# Patient Record
Sex: FEMALE | Race: WHITE | NOT HISPANIC OR LATINO | Employment: FULL TIME | URBAN - NONMETROPOLITAN AREA
[De-identification: names, ages, dates, MRNs, and addresses within clinical notes are randomized per-mention and may not be internally consistent; named-entity substitution may affect disease eponyms.]

---

## 2017-08-10 ENCOUNTER — OFFICE VISIT (OUTPATIENT)
Dept: CARDIOLOGY | Facility: CLINIC | Age: 57
End: 2017-08-10
Payer: COMMERCIAL

## 2017-08-10 VITALS
HEIGHT: 66 IN | SYSTOLIC BLOOD PRESSURE: 120 MMHG | BODY MASS INDEX: 37.77 KG/M2 | HEART RATE: 82 BPM | OXYGEN SATURATION: 97 % | DIASTOLIC BLOOD PRESSURE: 80 MMHG | WEIGHT: 235 LBS

## 2017-08-10 DIAGNOSIS — R94.31 NONSPECIFIC ABNORMAL ELECTROCARDIOGRAM (ECG) (EKG): ICD-10-CM

## 2017-08-10 DIAGNOSIS — Z01.810 PREOP CARDIOVASCULAR EXAM: ICD-10-CM

## 2017-08-10 PROCEDURE — 99204 OFFICE O/P NEW MOD 45 MIN: CPT | Performed by: INTERNAL MEDICINE

## 2017-08-10 ASSESSMENT — ENCOUNTER SYMPTOMS
HEARTBURN: 0
NERVOUS/ANXIOUS: 0
BRUISES/BLEEDS EASILY: 0
HEADACHES: 0
CHILLS: 0
DIZZINESS: 0
BLURRED VISION: 0
COUGH: 0
NAUSEA: 0
DEPRESSION: 0
PND: 0
FEVER: 0
SHORTNESS OF BREATH: 0
EYE DISCHARGE: 0
MYALGIAS: 0
PALPITATIONS: 0

## 2017-08-10 NOTE — Clinical Note
"     [facility] [address] [place] [address]  Phone: [phone]  Fax: [phone]              Vashti Vinson  1960    Encounter Date: 8/10/2017    Jason Edge M.D.          PROGRESS NOTE:  Subjective:   Vashti Vinson is a 56 y.o. female who presents today Self-referred because of abnormal EKG.  She is scheduled for  Right knee surgery on August 14.  Preop EKG demonstrates slow R wave progression across precordium and computer has raised question of anterior infarct.  EKG from 2009 is similar slow R-wave progression and T-wave inversion in V2 and V3  The patient has no history of heart disease or symptoms thereof   2 episodes of 2 minutesI have epigastric pain, worse with inspiration. One episode associated  With brief nausea.  Weight gain in last few years.  Mild fatigue  Some intermittent snoring.  Serum lipids unknown  Tobacco use 3 or 4 cigarettes per week    Past Medical History   Diagnosis Date   • Seizure (CMS-HCC)      \"Many years ago after getting out of hot tub. Passed out\" 1998   • Dental disorder      Root canal 7/25 left lower molar   • Arthritis      Past Surgical History   Procedure Laterality Date   • Knee arthroscopy Right    • Tonsillectomy     • Acl reconstruction Left    • Carpal tunnel release Right    • Inguinal hernia repair bilateral Bilateral      Family History   Problem Relation Age of Onset   • Other Neg Hx    • No Known Problems Mother    • No Known Problems Father    • No Known Problems Sister    • No Known Problems Brother      History   Smoking status   • Former Smoker   • Quit date: 03/07/2017   Smokeless tobacco   • Never Used     Allergies   Allergen Reactions   • Penicillins Rash and Swelling     Reaction as child     Outpatient Encounter Prescriptions as of 8/10/2017   Medication Sig Dispense Refill   • ibuprofen (MOTRIN) 200 MG Tab Take 200 mg by mouth every 6 hours as needed.     • " "hydrocodone-acetaminophen (NORCO) 5-325 MG Tab per tablet Take 1-2 Tabs by mouth every 8 hours as needed. 60 Tab 0     No facility-administered encounter medications on file as of 8/10/2017.     Review of Systems   Constitutional: Positive for malaise/fatigue. Negative for fever and chills.   Eyes: Negative for blurred vision and discharge.   Respiratory: Negative for cough and shortness of breath.    Cardiovascular: Negative for chest pain, palpitations, leg swelling and PND.   Gastrointestinal: Negative for heartburn and nausea.   Genitourinary: Negative for dysuria and urgency.   Musculoskeletal: Positive for joint pain. Negative for myalgias.   Skin: Negative for itching and rash.   Neurological: Negative for dizziness and headaches.   Endo/Heme/Allergies: Negative for environmental allergies. Does not bruise/bleed easily.   Psychiatric/Behavioral: Negative for depression. The patient is not nervous/anxious.         Objective:   /80 mmHg  Pulse 82  Ht 1.676 m (5' 5.98\")  SpO2 97%    Physical Exam   Constitutional: She is oriented to person, place, and time. She appears well-developed and well-nourished.   HENT:   Head: Normocephalic and atraumatic.   Eyes: Conjunctivae and EOM are normal. No scleral icterus.   Neck: Neck supple. No JVD present. No thyromegaly present.   Cardiovascular: Normal rate, regular rhythm and normal heart sounds.  Exam reveals no gallop and no friction rub.    No murmur heard.  Pulmonary/Chest: Effort normal and breath sounds normal. No respiratory distress. She has no wheezes. She has no rales. She exhibits no tenderness.   Abdominal: Soft. Bowel sounds are normal. She exhibits no distension and no mass. There is no tenderness.   Neurological: She is alert and oriented to person, place, and time. Coordination normal.   Skin: Skin is warm and dry. No rash noted. No pallor.   Psychiatric: She has a normal mood and affect. Her behavior is normal. Judgment and thought content " normal.       Assessment:     1. Preop cardiovascular exam     2. Nonspecific abnormal electrocardiogram (ECG) (EKG)         Medical Decision Making:  Today's Assessment / Status / Plan:   The likelihood that this patient has had an Infarct  Is very low  EKG is very similar to EKG of 2009  Patient is considered low  Risk for surgery and anesthesia  Echocardiogram ordered and this will be done  Electively but results of the echo do not affect the above recommendation.    Electively, consider overnight pulse oximetry to evaluate for sleep apnea  Thank you for this consult        Librado Santana M.D.  [address]  [place] [address]  VIA In Basket

## 2017-08-10 NOTE — PROGRESS NOTES
"Subjective:   Vashti Vinson is a 56 y.o. female who presents today Self-referred because of abnormal EKG.  She is scheduled for  Right knee surgery on August 14.  Preop EKG demonstrates slow R wave progression across precordium and computer has raised question of anterior infarct.  EKG from 2009 is similar slow R-wave progression and T-wave inversion in V2 and V3  The patient has no history of heart disease or symptoms thereof   2 episodes of 2 minutesI have epigastric pain, worse with inspiration. One episode associated  With brief nausea.  Weight gain in last few years.  Mild fatigue  Some intermittent snoring.  Serum lipids unknown  Tobacco use 3 or 4 cigarettes per week    Past Medical History   Diagnosis Date   • Seizure (CMS-HCC)      \"Many years ago after getting out of hot tub. Passed out\" 1998   • Dental disorder      Root canal 7/25 left lower molar   • Arthritis      Past Surgical History   Procedure Laterality Date   • Knee arthroscopy Right    • Tonsillectomy     • Acl reconstruction Left    • Carpal tunnel release Right    • Inguinal hernia repair bilateral Bilateral      Family History   Problem Relation Age of Onset   • Other Neg Hx    • No Known Problems Mother    • No Known Problems Father    • No Known Problems Sister    • No Known Problems Brother      History   Smoking status   • Former Smoker   • Quit date: 03/07/2017   Smokeless tobacco   • Never Used     Allergies   Allergen Reactions   • Penicillins Rash and Swelling     Reaction as child     Outpatient Encounter Prescriptions as of 8/10/2017   Medication Sig Dispense Refill   • ibuprofen (MOTRIN) 200 MG Tab Take 200 mg by mouth every 6 hours as needed.     • hydrocodone-acetaminophen (NORCO) 5-325 MG Tab per tablet Take 1-2 Tabs by mouth every 8 hours as needed. 60 Tab 0     No facility-administered encounter medications on file as of 8/10/2017.     Review of Systems   Constitutional: Positive for malaise/fatigue. Negative for fever and " "chills.   Eyes: Negative for blurred vision and discharge.   Respiratory: Negative for cough and shortness of breath.    Cardiovascular: Negative for chest pain, palpitations, leg swelling and PND.   Gastrointestinal: Negative for heartburn and nausea.   Genitourinary: Negative for dysuria and urgency.   Musculoskeletal: Positive for joint pain. Negative for myalgias.   Skin: Negative for itching and rash.   Neurological: Negative for dizziness and headaches.   Endo/Heme/Allergies: Negative for environmental allergies. Does not bruise/bleed easily.   Psychiatric/Behavioral: Negative for depression. The patient is not nervous/anxious.         Objective:   /80 mmHg  Pulse 82  Ht 1.676 m (5' 5.98\")  SpO2 97%    Physical Exam   Constitutional: She is oriented to person, place, and time. She appears well-developed and well-nourished.   HENT:   Head: Normocephalic and atraumatic.   Eyes: Conjunctivae and EOM are normal. No scleral icterus.   Neck: Neck supple. No JVD present. No thyromegaly present.   Cardiovascular: Normal rate, regular rhythm and normal heart sounds.  Exam reveals no gallop and no friction rub.    No murmur heard.  Pulmonary/Chest: Effort normal and breath sounds normal. No respiratory distress. She has no wheezes. She has no rales. She exhibits no tenderness.   Abdominal: Soft. Bowel sounds are normal. She exhibits no distension and no mass. There is no tenderness.   Neurological: She is alert and oriented to person, place, and time. Coordination normal.   Skin: Skin is warm and dry. No rash noted. No pallor.   Psychiatric: She has a normal mood and affect. Her behavior is normal. Judgment and thought content normal.       Assessment:     1. Preop cardiovascular exam     2. Nonspecific abnormal electrocardiogram (ECG) (EKG)         Medical Decision Making:  Today's Assessment / Status / Plan:   The likelihood that this patient has had an Infarct  Is very low  EKG is very similar to EKG of " 2009  Patient is considered low  Risk for surgery and anesthesia  Echocardiogram ordered and this will be done  Electively but results of the echo do not affect the above recommendation.    Electively, consider overnight pulse oximetry to evaluate for sleep apnea  Thank you for this consult

## 2017-08-10 NOTE — MR AVS SNAPSHOT
"Vashti Vinson   8/10/2017 8:20 AM   Office Visit   MRN: [id number]    Department:  [facility]   Dept Phone:  [phone]    Description:  Female : 1960   Provider:  Jason Edge M.D.           Reason for Visit     New Patient           Allergies as of 8/10/2017     Allergen Noted Reactions    Penicillins 2014   Rash, Swelling    Reaction as child      You were diagnosed with     Preop cardiovascular exam   [559634]       Nonspecific abnormal electrocardiogram (ECG) (EKG)   [794.31.ICD-9-CM]         Vital Signs     Blood Pressure Pulse Height Weight Body Mass Index Oxygen Saturation    120/80 mmHg 82 1.676 m (5' 5.98\") 106.595 kg (235 lb) 37.95 kg/m2 97%    Smoking Status                   Former Smoker           Basic Information     Date Of Birth Sex Race Ethnicity Preferred Language    1960 Female White Non- English      Your appointments     Aug 14, 2017  9:30 AM   Pre Admission Scheduled with PRE ADMIT TEST 1 [facility]   PREADMIT TESTS [facility] (--)    [address] [place] [address]   [phone]              Problem List              ICD-10-CM Priority Class Noted - Resolved    OA (osteoarthritis) of knee M17.10   2014 - Present      Health Maintenance        Date Due Completion Dates    IMM DTaP/Tdap/Td Vaccine (1 - Tdap) 10/20/1981 ---    PAP SMEAR 10/20/1983 ---    COLONOSCOPY 10/20/2012 ---    MAMMOGRAM 2013, 3/30/2012, 3/19/2012, 2009, 10/22/2009, 10/22/2009, 10/17/2008, 10/17/2008, 2007, 2007    IMM INFLUENZA (1) 2017 ---            Current Immunizations     No immunizations on file.      Below and/or attached are the medications your provider expects you to take. Review all of your home medications and newly ordered medications with your provider and/or pharmacist. Follow medication instructions as directed by your provider and/or pharmacist. Please keep your medication list with you and share with your " provider. Update the information when medications are discontinued, doses are changed, or new medications (including over-the-counter products) are added; and carry medication information at all times in the event of emergency situations     Allergies:  PENICILLINS - Rash,Swelling               Medications  Valid as of: August 11, 2017 - 10:15 AM    Generic Name Brand Name Tablet Size Instructions for use    Hydrocodone-Acetaminophen (Tab) NORCO 5-325 MG Take 1-2 Tabs by mouth every 8 hours as needed.        Ibuprofen (Tab) MOTRIN 200 MG Take 200 mg by mouth every 6 hours as needed.        .                 Medicines prescribed today were sent to:     [id number] - [address] AT [facility] & [address] [address]    Phone: [phone] Fax: [phone]    Open 24 Hours?: No      Medication refill instructions:       If your prescription bottle indicates you have medication refills left, it is not necessary to call your provider’s office. Please contact your pharmacy and they will refill your medication.    If your prescription bottle indicates you do not have any refills left, you may request refills at any time through one of the following ways: The online [employer] system (except Urgent Care), by calling your provider’s office, or by asking your pharmacy to contact your provider’s office with a refill request. Medication refills are processed only during regular business hours and may not be available until the next business day. Your provider may request additional information or to have a follow-up visit with you prior to refilling your medication.   *Please Note: Medication refills are assigned a new Rx number when refilled electronically. Your pharmacy may indicate that no refills were authorized even though a new prescription for the same medication is available at the pharmacy. Please request the medicine by name with the pharmacy before contacting  your provider for a refill.           Heap Access Code: [email or web address]  Expires: 9/6/2017  2:14 PM    Heap  A secure, online tool to manage your health information     [employer]’s Heap® is a secure, online tool that connects you to your personalized health information from the privacy of your home -- day or night - making it very easy for you to manage your healthcare. Once the activation process is completed, you can even access your medical information using the Heap cait, which is available for free in the Apple Cait store or Google Play store.     Heap provides the following levels of access (as shown below):   My Chart Features   [facility] Primary Care Doctor [facility]  Specialists [facility]  Urgent  Care Non-[facility]  Primary Care  Doctor   Email your healthcare team securely and privately 24/7 X X X    Manage appointments: schedule your next appointment; view details of past/upcoming appointments X      Request prescription refills. X      View recent personal medical records, including lab and immunizations X X X X   View health record, including health history, allergies, medications X X X X   Read reports about your outpatient visits, procedures, consult and ER notes X X X X   See your discharge summary, which is a recap of your hospital and/or ER visit that includes your diagnosis, lab results, and care plan. X X       How to register for Heap:  1. Go to  [email or web address].  2. Click on the Sign Up Now box, which takes you to the New Member Sign Up page. You will need to provide the following information:  a. Enter your Heap Access Code exactly as it appears at the top of this page. (You will not need to use this code after you’ve completed the sign-up process. If you do not sign up before the expiration date, you must request a new code.)   b. Enter your date of birth.   c. Enter your home email address.   d. Click Submit, and follow the next screen’s instructions.  3. Create a  MyChart ID. This will be your [employer] login ID and cannot be changed, so think of one that is secure and easy to remember.  4. Create a [employer] password. You can change your password at any time.  5. Enter your Password Reset Question and Answer. This can be used at a later time if you forget your password.   6. Enter your e-mail address. This allows you to receive e-mail notifications when new information is available in [employer].  7. Click Sign Up. You can now view your health information.    For assistance activating your [employer] account, call [phone]

## 2017-08-11 DIAGNOSIS — R94.31 NONSPECIFIC ABNORMAL ELECTROCARDIOGRAM (ECG) (EKG): ICD-10-CM

## 2017-08-14 PROBLEM — M17.10 KNEE ARTHROPATHY: Status: ACTIVE | Noted: 2017-08-14

## 2017-08-16 ENCOUNTER — TELEPHONE (OUTPATIENT)
Dept: CARDIOLOGY | Facility: MEDICAL CENTER | Age: 57
End: 2017-08-16

## 2017-08-16 NOTE — TELEPHONE ENCOUNTER
"----- Message from Kalli Okeefe sent at 8/16/2017 12:17 PM PDT -----  Regarding: RE: Authorization is Denied by Insurance  All noted with thanks and will re-submit request.      ----- Message -----     From: Starr Leach R.N.     Sent: 8/16/2017  11:58 AM       To: Starr Leach R.N., Kalli Okeefe  Subject: FW: Authorization is Denied by Insurance           Kalli, I called. The case is closed & needs to be re-submitted. Here is new info from Dr. Edge to use when you do.  Thanks, FARRAH Gill    ----- Message -----     From: Jason Edge M.D.     Sent: 8/16/2017  11:24 AM       To: Starr Leach R.N.  Subject: RE: Authorization is Denied by Insurance         Variable abnormal EKG does raise the question of previous small anteroseptal myocardial infarction.  An echocardiogram is recommended to exclude any structural heart disease that may be causing the abnormal EKG  ----- Message -----     From: Starr Leach R.N.     Sent: 8/16/2017  10:41 AM       To: Starr Leach R.N., Jason Edge M.D.  Subject: FW: Authorization is Denied by Insurance         RG please advise if you want to proceed with echo & I will try to call & see what they need. You note says echo was elective.  ----- Message -----     From: Kalli Okeefe     Sent: 8/16/2017   9:17 AM       To: Starr Leach R.N., Kalli Okeefe  Subject: Authorization is Denied by Insurance             Dr Edge ordered an echocardiogram but insurance has denied authorization as it does not meet criteria e.g. Abnormal ekg and the abnormal readings >> \"Echocardiography is appropriate in the further evaluation of abnormal test results which suggest underlying cardiac disease. The information provided does not indicate that this is the reason for the study.\"    You/Dr Edge may appeal the decision by calling [facility] [phone] following prompt to physicians review, identfying the case by patient's [place] #[id number].    Please let me know either way.  " Thank you.

## 2017-08-18 ENCOUNTER — TELEPHONE (OUTPATIENT)
Dept: CARDIOLOGY | Facility: MEDICAL CENTER | Age: 57
End: 2017-08-18

## 2017-08-18 NOTE — TELEPHONE ENCOUNTER
"----- Message from Kalli Okeefe sent at 8/18/2017  3:26 PM PDT -----  Regarding: RE: Authorization is Denied by Insurance  I submitted the request on Wednesday and it is still 'in progress' meaning 'under review'.  Would it be possible to ask to phone [employer] again to physician's review to get this auth pushed through?  [facility] [phone] following prompt to physicians review, identfying the case by patient's [place] #[id number].   Thank you Bridget    ----- Message -----     From: Starr Leach R.N.     Sent: 8/16/2017  11:58 AM       To: Starr Leach R.N., Kalli Okeefe  Subject: FW: Authorization is Denied by Insurance           Kalli, I called. The case is closed & needs to be re-submitted. Here is new info from Dr. Edge to use when you do.  Thanks, FARRAH Gill    ----- Message -----     From: Jason Edge M.D.     Sent: 8/16/2017  11:24 AM       To: Starr Leach R.N.  Subject: RE: Authorization is Denied by Insurance         Variable abnormal EKG does raise the question of previous small anteroseptal myocardial infarction.  An echocardiogram is recommended to exclude any structural heart disease that may be causing the abnormal EKG  ----- Message -----     From: Starr Leach R.N.     Sent: 8/16/2017  10:41 AM       To: Starr Leach R.N., Jason Edge M.D.  Subject: FW: Authorization is Denied by Insurance         RG please advise if you want to proceed with echo & I will try to call & see what they need. You note says echo was elective.  ----- Message -----     From: Kalli Okeefe     Sent: 8/16/2017   9:17 AM       To: Starr Leach R.N., Kalli Okeefe  Subject: Authorization is Denied by Insurance             Dr Edge ordered an echocardiogram but insurance has denied authorization as it does not meet criteria e.g. Abnormal ekg and the abnormal readings >> \"Echocardiography is appropriate in the further evaluation of abnormal test results which suggest underlying cardiac disease. The " "information provided does not indicate that this is the reason for the study.\"    You/Dr Edge may appeal the decision by calling [facility] [phone] following prompt to physicians review, identfying the case by patient's [facility] #[id number].    Please let me know either way.  Thank you.          "

## 2017-08-18 NOTE — TELEPHONE ENCOUNTER
Spoke with [employer]:  Approval # [id number].  Good for 8-16-17 through 9-14-17. To Kalli via staff message.

## 2017-08-24 PROBLEM — Z96.651 STATUS POST TOTAL RIGHT KNEE REPLACEMENT: Status: ACTIVE | Noted: 2017-08-24

## 2017-09-12 ENCOUNTER — TELEPHONE (OUTPATIENT)
Dept: CARDIOLOGY | Facility: MEDICAL CENTER | Age: 57
End: 2017-09-12

## 2017-09-12 DIAGNOSIS — R94.31 NONSPECIFIC ABNORMAL ELECTROCARDIOGRAM (ECG) (EKG): ICD-10-CM

## 2017-09-12 NOTE — TELEPHONE ENCOUNTER
Echo now in media.  ER records rec'd (will be scanned Wed.9/13) To Dr. Edge to advise on pt's questions.

## 2017-09-12 NOTE — TELEPHONE ENCOUNTER
Saw Dr. Edge 8/10/17 as NP for surgical clearance due to abnormal EKG. He cleared her for total knee surgery. Dr. FRIED did advise an elective echo - which was finally approved by her insurance & done 9/3/17 @ [facility]. She calls today to ask:  1) what are echo results & does she need to F/U with Dr. Edge?  2) Had episode CP a few days ago. So severe went to [facility] ER. On & off x 35 min. Troponins were negative per ER MD. Rest of cardiac w/u apparently negative. Could Dr. Edge review ER records      and advise if echo is all OK does she need any further w/u?     I requested records from [facility], then will forward to Dr. Edge.

## 2017-09-12 NOTE — TELEPHONE ENCOUNTER
----- Message from Madeline Sewell sent at 9/11/2017  4:58 PM PDT -----  Regarding: pain in chest - went to E/R  Contact: [phone]  FARIHA/brenden    Pt calling to report very sharp pain in center of chest out to her back this morning & went to E/R at [place] in [place], was tested and no results were alarming. Pt was told to call RG.  Please call Vashti at

## 2017-09-14 ENCOUNTER — TELEPHONE (OUTPATIENT)
Dept: CARDIOLOGY | Facility: MEDICAL CENTER | Age: 57
End: 2017-09-14

## 2017-09-14 NOTE — TELEPHONE ENCOUNTER
[place] records reviewed  Chest pain did not sound cardiac  EKG stable  No further workup needed in my opinion

## 2017-09-14 NOTE — TELEPHONE ENCOUNTER
----- Message from Reyna Shen R.N. sent at 9/14/2017 10:41 AM PDT -----      ----- Message -----  From: Jason Edge M.D.  Sent: 9/14/2017  10:19 AM  To: Mickie Esparza R.N.    Echo normal  Therefore no prior heart   No further testing needed